# Patient Record
Sex: FEMALE | Race: WHITE
[De-identification: names, ages, dates, MRNs, and addresses within clinical notes are randomized per-mention and may not be internally consistent; named-entity substitution may affect disease eponyms.]

---

## 2018-01-30 ENCOUNTER — HOSPITAL ENCOUNTER (OUTPATIENT)
Dept: HOSPITAL 95 - LAB RH | Age: 83
Discharge: HOME | End: 2018-01-30
Payer: COMMERCIAL

## 2018-01-30 DIAGNOSIS — N39.0: Primary | ICD-10-CM

## 2018-01-30 LAB
LEUKOCYTE ESTERASE UR QL STRIP: (no result)
PROT UR STRIP-MCNC: (no result) MG/DL
RBC #/AREA URNS HPF: (no result) /HPF (ref 0–2)
SP GR SPEC: 1.01 (ref 1–1.02)
UROBILINOGEN UR STRIP-MCNC: (no result) MG/DL
WBC #/AREA URNS HPF: (no result) /HPF (ref 0–5)

## 2018-01-31 ENCOUNTER — HOSPITAL ENCOUNTER (OUTPATIENT)
Dept: HOSPITAL 95 - LAB RH | Age: 83
Discharge: HOME | End: 2018-01-31
Payer: COMMERCIAL

## 2018-01-31 DIAGNOSIS — N39.0: Primary | ICD-10-CM

## 2019-08-07 ENCOUNTER — HOSPITAL ENCOUNTER (INPATIENT)
Dept: HOSPITAL 95 - ER | Age: 84
LOS: 3 days | Discharge: NURSING, REHAB OR LONG-TERM CARE | DRG: 177 | End: 2019-08-10
Attending: INTERNAL MEDICINE | Admitting: INTERNAL MEDICINE
Payer: COMMERCIAL

## 2019-08-07 VITALS — HEIGHT: 60.98 IN | WEIGHT: 150 LBS | BODY MASS INDEX: 28.32 KG/M2

## 2019-08-07 DIAGNOSIS — Z87.891: ICD-10-CM

## 2019-08-07 DIAGNOSIS — Z86.73: ICD-10-CM

## 2019-08-07 DIAGNOSIS — R13.10: ICD-10-CM

## 2019-08-07 DIAGNOSIS — E87.6: ICD-10-CM

## 2019-08-07 DIAGNOSIS — F03.90: ICD-10-CM

## 2019-08-07 DIAGNOSIS — R21: ICD-10-CM

## 2019-08-07 DIAGNOSIS — D63.8: ICD-10-CM

## 2019-08-07 DIAGNOSIS — I10: ICD-10-CM

## 2019-08-07 DIAGNOSIS — Z79.891: ICD-10-CM

## 2019-08-07 DIAGNOSIS — G89.29: ICD-10-CM

## 2019-08-07 DIAGNOSIS — Z51.5: ICD-10-CM

## 2019-08-07 DIAGNOSIS — Z74.01: ICD-10-CM

## 2019-08-07 DIAGNOSIS — J96.01: ICD-10-CM

## 2019-08-07 DIAGNOSIS — Z66: ICD-10-CM

## 2019-08-07 DIAGNOSIS — D64.9: ICD-10-CM

## 2019-08-07 DIAGNOSIS — J69.0: Primary | ICD-10-CM

## 2019-08-07 DIAGNOSIS — H91.90: ICD-10-CM

## 2019-08-07 DIAGNOSIS — E78.5: ICD-10-CM

## 2019-08-07 DIAGNOSIS — F32.9: ICD-10-CM

## 2019-08-07 LAB
ALBUMIN SERPL BCP-MCNC: 2.9 G/DL (ref 3.4–5)
ALBUMIN/GLOB SERPL: 0.7 {RATIO} (ref 0.8–1.8)
ALT SERPL W P-5'-P-CCNC: 47 U/L (ref 12–78)
ANION GAP SERPL CALCULATED.4IONS-SCNC: 7 MMOL/L (ref 6–16)
AST SERPL W P-5'-P-CCNC: 50 U/L (ref 12–37)
BASOPHILS # BLD AUTO: 0.03 K/MM3 (ref 0–0.23)
BASOPHILS NFR BLD AUTO: 0 % (ref 0–2)
BILIRUB SERPL-MCNC: 1.2 MG/DL (ref 0.1–1)
BUN SERPL-MCNC: 29 MG/DL (ref 8–24)
CALCIUM SERPL-MCNC: 8.8 MG/DL (ref 8.5–10.1)
CHLORIDE SERPL-SCNC: 103 MMOL/L (ref 98–108)
CO2 SERPL-SCNC: 32 MMOL/L (ref 21–32)
CREAT SERPL-MCNC: 0.92 MG/DL (ref 0.4–1)
DEPRECATED RDW RBC AUTO: 44.7 FL (ref 35.1–46.3)
EOSINOPHIL # BLD AUTO: 0.33 K/MM3 (ref 0–0.68)
EOSINOPHIL NFR BLD AUTO: 5 % (ref 0–6)
ERYTHROCYTE [DISTWIDTH] IN BLOOD BY AUTOMATED COUNT: 12.9 % (ref 11.7–14.2)
GLOBULIN SER CALC-MCNC: 4.1 G/DL (ref 2.2–4)
GLUCOSE SERPL-MCNC: 104 MG/DL (ref 70–99)
HCT VFR BLD AUTO: 30.6 % (ref 33–51)
HGB BLD-MCNC: 10 G/DL (ref 11.5–16)
IMM GRANULOCYTES # BLD AUTO: 0.08 K/MM3 (ref 0–0.1)
IMM GRANULOCYTES NFR BLD AUTO: 1 % (ref 0–1)
LYMPHOCYTES # BLD AUTO: 1.26 K/MM3 (ref 0.84–5.2)
LYMPHOCYTES NFR BLD AUTO: 18 % (ref 21–46)
MCHC RBC AUTO-ENTMCNC: 32.7 G/DL (ref 31.5–36.5)
MCV RBC AUTO: 95 FL (ref 80–100)
MONOCYTES # BLD AUTO: 0.95 K/MM3 (ref 0.16–1.47)
MONOCYTES NFR BLD AUTO: 14 % (ref 4–13)
NEUTROPHILS # BLD AUTO: 4.29 K/MM3 (ref 1.96–9.15)
NEUTROPHILS NFR BLD AUTO: 62 % (ref 41–73)
NRBC # BLD AUTO: 0 K/MM3 (ref 0–0.02)
NRBC BLD AUTO-RTO: 0 /100 WBC (ref 0–0.2)
PLATELET # BLD AUTO: 132 K/MM3 (ref 150–400)
POTASSIUM SERPL-SCNC: 2.8 MMOL/L (ref 3.5–5.5)
PROT SERPL-MCNC: 7 G/DL (ref 6.4–8.2)
SODIUM SERPL-SCNC: 142 MMOL/L (ref 136–145)

## 2019-08-07 PROCEDURE — A9270 NON-COVERED ITEM OR SERVICE: HCPCS

## 2019-08-07 PROCEDURE — G0378 HOSPITAL OBSERVATION PER HR: HCPCS

## 2019-08-07 NOTE — NUR
PT ADMITTED TO ROOM 312 FROM ED VIA [facility].  REQUIRED MAX ASSIST TO TRANSFER
FROM THERE TO BED WITH HER CRYING OUT.  ALERT BUT CONFUSED.  ABLE TO HEAR FROM
L EAR AND RESPOND APPROPRIATELY BUT DOESN'T RESPOND OTHERWISE.

## 2019-08-07 NOTE — NUR
SHIFT SUMMARY
DAUGHTER AT BEDSIDE AND DISCUSSED POLST IN CHART AND GOT CODE STATUS CHANGED.
PT HASN'T ATTEMPTED TO CLIMB OOB BUT HAS BEEN INCONTINENT.  TAKING MEDS WHOLE
WITH NOT PROBLEM.  FEEDING SELF.  AT TIMES HAS AUDIBLE WHEEZES.  ENCOURAGED TO
KEEP O2 ON BUT REMOVES IT FREQUENTLY AND SAYS SHE DOESN'T WANT IT.

## 2019-08-08 LAB
ANION GAP SERPL CALCULATED.4IONS-SCNC: 7 MMOL/L (ref 6–16)
BASOPHILS # BLD AUTO: 0.02 K/MM3 (ref 0–0.23)
BASOPHILS NFR BLD AUTO: 0 % (ref 0–2)
BUN SERPL-MCNC: 17 MG/DL (ref 8–24)
CALCIUM SERPL-MCNC: 8.4 MG/DL (ref 8.5–10.1)
CHLORIDE SERPL-SCNC: 104 MMOL/L (ref 98–108)
CO2 SERPL-SCNC: 31 MMOL/L (ref 21–32)
CREAT SERPL-MCNC: 0.76 MG/DL (ref 0.4–1)
DEPRECATED RDW RBC AUTO: 42.5 FL (ref 35.1–46.3)
EOSINOPHIL # BLD AUTO: 0.05 K/MM3 (ref 0–0.68)
EOSINOPHIL NFR BLD AUTO: 1 % (ref 0–6)
ERYTHROCYTE [DISTWIDTH] IN BLOOD BY AUTOMATED COUNT: 12.8 % (ref 11.7–14.2)
GLUCOSE SERPL-MCNC: 98 MG/DL (ref 70–99)
HCT VFR BLD AUTO: 26.2 % (ref 33–51)
HGB BLD-MCNC: 8.7 G/DL (ref 11.5–16)
IMM GRANULOCYTES # BLD AUTO: 0.09 K/MM3 (ref 0–0.1)
IMM GRANULOCYTES NFR BLD AUTO: 2 % (ref 0–1)
LYMPHOCYTES # BLD AUTO: 0.97 K/MM3 (ref 0.84–5.2)
LYMPHOCYTES NFR BLD AUTO: 16 % (ref 21–46)
MCHC RBC AUTO-ENTMCNC: 33.2 G/DL (ref 31.5–36.5)
MCV RBC AUTO: 92 FL (ref 80–100)
MONOCYTES # BLD AUTO: 0.66 K/MM3 (ref 0.16–1.47)
MONOCYTES NFR BLD AUTO: 11 % (ref 4–13)
NEUTROPHILS # BLD AUTO: 4.36 K/MM3 (ref 1.96–9.15)
NEUTROPHILS NFR BLD AUTO: 71 % (ref 41–73)
NRBC # BLD AUTO: 0 K/MM3 (ref 0–0.02)
NRBC BLD AUTO-RTO: 0 /100 WBC (ref 0–0.2)
PLATELET # BLD AUTO: 141 K/MM3 (ref 150–400)
POTASSIUM SERPL-SCNC: 3.1 MMOL/L (ref 3.5–5.5)
SODIUM SERPL-SCNC: 142 MMOL/L (ref 136–145)

## 2019-08-08 NOTE — NUR
END OF SHIFT SUMMARY:
PATIENT REPORTED FEELING POORLY THIS MORNING. BY THIS EVENING, PATIENT WAS
RESTING COMFORTABLY. PATIENT UP TO CHAIR FOR LUNCH. PATIENT APPEARED TO ENJOY
BEING UP IN THE CHAIR AS EVIDENCED BY HER SMILE AND LOOKING AROUND THE ROOM
AND INTO THE HALLWAY. FAMILY AT BEDSIDE AT MULTIPLE TIMES. THEY REPORTED
CONCERNS ABOUT THE CARE THAT SHE RECEIVES AT [facility]. MESSAGE LEFT FOR AUDREY MCDONALD. PATIENT HAD SOME AUDIBLE WHEEZES THIS AM. UTILIZED RT TO ASSESS AND
DETERMINE TREATMENT. PATIENT REPOSITIONED THROUGHOUT THE DAY TO ASSIST WITH
BREATHING. PATIENT YELLS "I DON'T NEED OXYGEN" REPEATEDLY WHEN THE O2 IS
PLACED. PROTECTIVE PADS PLACED ON PATIENTS ELBOWS FOR BLANCHABLE RED AREAS.

## 2019-08-08 NOTE — NUR
Shift summary:  Pt very confused and almost deaf.  Pt has no idea why or where
she is.  Curses alot in response to any question.  Was able to get meds down
with water after some convincing.  Pt has occas cough.  Iv pulled out by
patient and restarted in right arm.  Pt has large area of yeast on back jeremy
area and back.  Nystatin applied and picture was taken.

## 2019-08-08 NOTE — NUR
Pt visit this evening. Pt is resting in bed with her eyes closed. Offered
gentle voice 3 times with Pt remaining a sleep.
 
Spoke with bedside nurse Emilie and on comming bedside nurse and discussed case.
Both nurses request a conversation regarding code status with family. Emilie
reports no concerns with symptoms at this time. CNA reports bedside nurse
yesterday briefly discussed current POLST in chart with family and family has
elected to keep Pt full code at this time. CNA reports family will be here to
visit Pt in the morning.
 
Palliative Care will remain available.

## 2019-08-09 LAB
ANION GAP SERPL CALCULATED.4IONS-SCNC: 8 MMOL/L (ref 6–16)
BASOPHILS # BLD AUTO: 0.04 K/MM3 (ref 0–0.23)
BASOPHILS NFR BLD AUTO: 1 % (ref 0–2)
BUN SERPL-MCNC: 12 MG/DL (ref 8–24)
CALCIUM SERPL-MCNC: 8.9 MG/DL (ref 8.5–10.1)
CHLORIDE SERPL-SCNC: 106 MMOL/L (ref 98–108)
CO2 SERPL-SCNC: 25 MMOL/L (ref 21–32)
CREAT SERPL-MCNC: 0.64 MG/DL (ref 0.4–1)
DEPRECATED RDW RBC AUTO: 42.5 FL (ref 35.1–46.3)
EOSINOPHIL # BLD AUTO: 0.09 K/MM3 (ref 0–0.68)
EOSINOPHIL NFR BLD AUTO: 2 % (ref 0–6)
ERYTHROCYTE [DISTWIDTH] IN BLOOD BY AUTOMATED COUNT: 12.7 % (ref 11.7–14.2)
GLUCOSE SERPL-MCNC: 82 MG/DL (ref 70–99)
HCT VFR BLD AUTO: 30.3 % (ref 33–51)
HGB BLD-MCNC: 10 G/DL (ref 11.5–16)
IMM GRANULOCYTES # BLD AUTO: 0.08 K/MM3 (ref 0–0.1)
IMM GRANULOCYTES NFR BLD AUTO: 1 % (ref 0–1)
LYMPHOCYTES # BLD AUTO: 0.6 K/MM3 (ref 0.84–5.2)
LYMPHOCYTES NFR BLD AUTO: 10 % (ref 21–46)
MCHC RBC AUTO-ENTMCNC: 33 G/DL (ref 31.5–36.5)
MCV RBC AUTO: 92 FL (ref 80–100)
MONOCYTES # BLD AUTO: 0.54 K/MM3 (ref 0.16–1.47)
MONOCYTES NFR BLD AUTO: 9 % (ref 4–13)
NEUTROPHILS # BLD AUTO: 4.53 K/MM3 (ref 1.96–9.15)
NEUTROPHILS NFR BLD AUTO: 77 % (ref 41–73)
NRBC # BLD AUTO: 0 K/MM3 (ref 0–0.02)
NRBC BLD AUTO-RTO: 0 /100 WBC (ref 0–0.2)
PLATELET # BLD AUTO: 168 K/MM3 (ref 150–400)
POTASSIUM SERPL-SCNC: 3.6 MMOL/L (ref 3.5–5.5)
SODIUM SERPL-SCNC: 139 MMOL/L (ref 136–145)

## 2019-08-09 NOTE — NUR
PATIENT STATUS: LATE ENTRY: [id number]
SPOKE WITH DR. RUVALCABA ABOUT PATIENT'S STATUS. PATIENT RESPONDS TO PHYSICAL
STIMULI (REPOSITIONING) AND WILL ANSWER SOME QUESTIONS WITH A GENERAL
RESPONSE. PATIENT DOES NOT OPEN HER EYES TO STIMULI. THIS IS A CHANGE FROM
YESTERDAY. DR. RUVALCABA AWARE. DISCUSSED WITH PALLIATATIVE RN, JERRI. SHE WILL
MEET WITH THE FAMILY AT 12:30.

## 2019-08-09 NOTE — NUR
Shift summary:  Pt has slept thru most of shift with an occasional cough.  I
could not get pt to take her medications.  Pt changed x 2 during the night.
Pt is incontinent of stool and urine.  Pt has large yeast infection on her
backside.  Nystatin applied.  Pt very confused and will not follow commands
and is almost deaf.

## 2019-08-09 NOTE — NUR
MEDICATIONS AND FEEDING:
ATTEMPTED AGAIN TO SEE IF PATIENT COULD BE ROUSED TO EAT OR TAKE MEDICATIONS
SAFELY. PATIENT RESPONDED "YES" WHEN ASKED IF SHE WAS HUNGRY. UNABLE TO SAFELY
MOVE TO THE CHAIR. PATIENT DID NOT OPEN EYES OR FOLLOW DIRECTIONS ENOUGH TO
SAFELY ADMINISTER MEDICATIONS OR ATTEMPT TO FEED. NOTIFIED DR. RUVALCABA. NEW ORDER
FOR ANTIBIOTICS TO BE IV AGAIN. ALSO DISCUSSED THAT PATIENT WILL BE UNABLE TO
GET HER LONG ACTING MORPHINE. DR. RUVALCABA WILL ADDRESS. PATIENT IS AT BEDSIDE AND
UNDERSTANDS INABILITY TO SAFELY FEED THEIR FAMILY MEMBER. MESSAGE LEFT FOR EUGENE CHURCH-FREE TO VISIT FAMILY PER FAMILY REQUEST.

## 2019-08-09 NOTE — NUR
END OF SHIFT SUMMARY: PATIENT CONTINUED TO BE LETHARGIC DURING SHIFT.
RESPONSIVE TO VERBAL AND PHYSICAL STIMULI (REPOSITION AND CHANGES). PATIENT
WOULD RESPOND TO YES AND NO QUESTIONS AT TIMES. PATIENT UNABLE TO FOLLOW
DIRECTIONS OR OPEN EYES. PALLIATIVE CARE RN, M -FREE AND DISCHARGE
PLANNEREUGENE ABLE TO SPEAK WITH FAMILY. PLAN IS FOR THE PATIENT TO
DISCHARGE ON HOSPICE TO [facility] TOMORROW. PATIENT APPEARED PAINFUL THIS
EVENING WITH FURROWED BROWN AND RESTLESSNESS. SPOKE WITH DR. RUVALCABA AND RECEIVED
NEW ORDER FOR PO ROXANOL. MEDICATED ONCE AND REPOSITIONED PATIENT. PATIENT
APPEARS CALM AND COMFORTABLE AT THIS TIME. FAMILY REQUESTS NO MORE BLOOD
PRESSURES RELATED TO THE PAIN THAT IT CAUSES THE PATIENT. DR. RUVALCABA INFORMED,
NEW ORDER TO DISCONTINUE BLOOD PRESSURES RECEIVED. PATIENT TOLERATING IV
ANTIBIOTICS WELL.

## 2019-08-09 NOTE — NUR
Clinical Visit:
 
Pt appears to be sleeping soundly. She did not wake during the time palliative
was in the room. Two visits to this room today: Pt's daughter, Aviva, is
bedside. She reports that pt hasn't wanted to eat, sleeps all the time.
Aviva wanted to talk about placement, so Juliet Estes was asked to see her.
 
Called back to room to discuss hospice care. Family is ready to take this
step, as they are watching the pt's decline. They have watched several family
members on hospice and state "It's time." Filled out new POLST, answered
questions for family.
 
Placed call placed to Dr. Fajardo. He would like the IV restarted and antibiotic
ran. Message left for nurse to restart IV and give medication. No other
concerns with discharge. Did discuss with hospice liasonLynne. Will remain
available. MATTHIEU still needs signature and is in the chart.

## 2019-08-10 NOTE — NUR
PT. TRANSFERRED TO [facility]IA [facility].  PT. RETURNING TO 
AND WILL BE ON HOSPICE.  PT CLEAN AND DRY. REPORT CALLED TO DODIE WINCHESTER AT [facility].

## 2019-08-10 NOTE — NUR
SHIFT SUMMARY
PATIENT HAD NO ACUTE CHANGES OBSERVED. HX DEMENTIA, EXTREMELY [place] AND BEDFAST.
TELE WAS DC'D ON DAY SHIFT AND REMOVE NOC SHIFT. PATIENT PULLED OUT IV AND NO
IV ACCESS ORDERED PER HOSPITALIST DR CONNOR. DR ALSO CHANGED CODE STATUS
FROM FULL CODE TO DNR PER DR MARIANN SOMMERS IN PATIENT CHART. PATIENT GOING ON
HOSPICE TO [facility] PER DAY RN. ROXANOL 10 MG GIVEN PER GENERALIZE PAIN.
PATIENT RESTLESS AND TRY TO EXIT BED X FOUR. AGITATES QUICKLY CURSING AT
STAFF AND SETTLES BACK DOWN. DOESN'T USE CALL LIGHT. BED ALARM ACITVATED. WILL
CONTINUE TO MONITOR UNTIL DAY SHIFT NURSE ASSUMES CARE. NO VITALS TAKEN PER
ORDERS AND FAMILY.

## 2019-08-10 NOTE — NUR
HOSPITALIST DR CONNOR CHANGE CODE STATUS FROM FULL CODE TO DNR PER DR MARIANN SOMMERS IN CHART ON 8/9/19. NO IV ACCESS ORDERED.